# Patient Record
Sex: FEMALE | Race: WHITE | NOT HISPANIC OR LATINO | URBAN - METROPOLITAN AREA
[De-identification: names, ages, dates, MRNs, and addresses within clinical notes are randomized per-mention and may not be internally consistent; named-entity substitution may affect disease eponyms.]

---

## 2019-07-02 ENCOUNTER — TRANSFERRED RECORDS (OUTPATIENT)
Dept: HEALTH INFORMATION MANAGEMENT | Facility: CLINIC | Age: 59
End: 2019-07-02

## 2019-07-03 ENCOUNTER — TRANSFERRED RECORDS (OUTPATIENT)
Dept: HEALTH INFORMATION MANAGEMENT | Facility: CLINIC | Age: 59
End: 2019-07-03

## 2019-07-03 ENCOUNTER — HOSPITAL ENCOUNTER (OUTPATIENT)
Facility: CLINIC | Age: 59
Setting detail: OBSERVATION
Discharge: HOME | DRG: 914 | End: 2019-07-06
Attending: ORTHOPAEDIC SURGERY | Admitting: ORTHOPAEDIC SURGERY
Payer: COMMERCIAL

## 2019-07-03 DIAGNOSIS — S76.312A HAMSTRING MUSCLE STRAIN, LEFT, INITIAL ENCOUNTER: Primary | ICD-10-CM

## 2019-07-03 LAB
CREAT SERPL-MCNC: 0.98 MG/DL (ref 0.4–1)
GFR SERPL CREATININE-BSD FRML MDRD: 58 ML/MIN/1.73M2
GLUCOSE SERPL-MCNC: 159 MG/DL (ref 70–99)
INR PPP: 1.1 (ref 0.9–1.1)
POTASSIUM SERPL-SCNC: 3.9 MEQ/L (ref 3.4–5.1)

## 2019-07-03 PROCEDURE — 25000132 ZZH RX MED GY IP 250 OP 250 PS 637: Performed by: PHYSICIAN ASSISTANT

## 2019-07-03 PROCEDURE — G0378 HOSPITAL OBSERVATION PER HR: HCPCS

## 2019-07-03 PROCEDURE — 12000000 ZZH R&B MED SURG/OB

## 2019-07-03 RX ORDER — ACETAMINOPHEN 650 MG/1
650 SUPPOSITORY RECTAL EVERY 4 HOURS PRN
Status: DISCONTINUED | OUTPATIENT
Start: 2019-07-03 | End: 2019-07-06 | Stop reason: HOSPADM

## 2019-07-03 RX ORDER — FUROSEMIDE 20 MG
20 TABLET ORAL DAILY PRN
COMMUNITY

## 2019-07-03 RX ORDER — TRIAMCINOLONE ACETONIDE 1 MG/G
CREAM TOPICAL 3 TIMES DAILY PRN
COMMUNITY

## 2019-07-03 RX ORDER — FLUOCINONIDE 0.5 MG/G
OINTMENT TOPICAL 2 TIMES DAILY PRN
COMMUNITY
End: 2019-07-23

## 2019-07-03 RX ORDER — HYDROCODONE BITARTRATE AND ACETAMINOPHEN 5; 325 MG/1; MG/1
1-2 TABLET ORAL EVERY 6 HOURS PRN
Status: ON HOLD | COMMUNITY
End: 2019-07-06

## 2019-07-03 RX ORDER — OXYCODONE HYDROCHLORIDE 5 MG/1
5 TABLET ORAL EVERY 4 HOURS PRN
Status: DISCONTINUED | OUTPATIENT
Start: 2019-07-03 | End: 2019-07-06 | Stop reason: HOSPADM

## 2019-07-03 RX ORDER — NALOXONE HYDROCHLORIDE 0.4 MG/ML
.1-.4 INJECTION, SOLUTION INTRAMUSCULAR; INTRAVENOUS; SUBCUTANEOUS
Status: DISCONTINUED | OUTPATIENT
Start: 2019-07-03 | End: 2019-07-06 | Stop reason: HOSPADM

## 2019-07-03 RX ORDER — TRIAMCINOLONE ACETONIDE 0.25 MG/G
CREAM TOPICAL 2 TIMES DAILY
COMMUNITY

## 2019-07-03 RX ORDER — ACETAMINOPHEN 325 MG/1
650 TABLET ORAL EVERY 4 HOURS PRN
Status: DISCONTINUED | OUTPATIENT
Start: 2019-07-03 | End: 2019-07-06 | Stop reason: HOSPADM

## 2019-07-03 RX ORDER — FLUOCINONIDE TOPICAL SOLUTION USP, 0.05% 0.5 MG/ML
SOLUTION TOPICAL 2 TIMES DAILY PRN
COMMUNITY

## 2019-07-03 RX ORDER — HYDROXYZINE HYDROCHLORIDE 25 MG/1
25-50 TABLET, FILM COATED ORAL
COMMUNITY

## 2019-07-03 RX ADMIN — ACETAMINOPHEN 650 MG: 325 TABLET, FILM COATED ORAL at 17:43

## 2019-07-03 RX ADMIN — OXYCODONE HYDROCHLORIDE 5 MG: 5 TABLET ORAL at 17:43

## 2019-07-03 ASSESSMENT — ACTIVITIES OF DAILY LIVING (ADL)
TOILETING: 0-->INDEPENDENT
WHICH_OF_THE_ABOVE_FUNCTIONAL_RISKS_HAD_A_RECENT_ONSET_OR_CHANGE?: AMBULATION;TRANSFERRING;TOILETING;BATHING;DRESSING
ADLS_ACUITY_SCORE: 13
BATHING: 0-->INDEPENDENT
RETIRED_COMMUNICATION: 0-->UNDERSTANDS/COMMUNICATES WITHOUT DIFFICULTY
SWALLOWING: 0-->SWALLOWS FOODS/LIQUIDS WITHOUT DIFFICULTY
COGNITION: 0 - NO COGNITION ISSUES REPORTED
AMBULATION: 0-->INDEPENDENT
FALL_HISTORY_WITHIN_LAST_SIX_MONTHS: NO
ADLS_ACUITY_SCORE: 19
TRANSFERRING: 0-->INDEPENDENT
RETIRED_EATING: 0-->INDEPENDENT
DRESS: 0-->INDEPENDENT

## 2019-07-03 NOTE — PLAN OF CARE
DA from ANNA MARIE Lara @ 1600.  Non surgical, pain management.  VSS, ex slight tachy.  CMS intact, doppler pulse.  Numbness and tingling on LLE, ortho aware.  Started pt on oxy and tylenol for pain in hamstring.  Up with 1 to BSC.  Toleration diet.  Continue to monitor.

## 2019-07-04 ENCOUNTER — APPOINTMENT (OUTPATIENT)
Dept: PHYSICAL THERAPY | Facility: CLINIC | Age: 59
DRG: 914 | End: 2019-07-04
Attending: PHYSICIAN ASSISTANT
Payer: COMMERCIAL

## 2019-07-04 ENCOUNTER — APPOINTMENT (OUTPATIENT)
Dept: MRI IMAGING | Facility: CLINIC | Age: 59
DRG: 914 | End: 2019-07-04
Attending: ORTHOPAEDIC SURGERY
Payer: COMMERCIAL

## 2019-07-04 LAB
ANION GAP SERPL CALCULATED.3IONS-SCNC: 3 MMOL/L (ref 3–14)
BUN SERPL-MCNC: 28 MG/DL (ref 7–30)
CALCIUM SERPL-MCNC: 8.8 MG/DL (ref 8.5–10.1)
CHLORIDE SERPL-SCNC: 106 MMOL/L (ref 94–109)
CO2 SERPL-SCNC: 29 MMOL/L (ref 20–32)
CREAT SERPL-MCNC: 1.06 MG/DL (ref 0.52–1.04)
ERYTHROCYTE [DISTWIDTH] IN BLOOD BY AUTOMATED COUNT: 16.5 % (ref 10–15)
GFR SERPL CREATININE-BSD FRML MDRD: 58 ML/MIN/{1.73_M2}
GLUCOSE SERPL-MCNC: 123 MG/DL (ref 70–99)
HCT VFR BLD AUTO: 35.3 % (ref 35–47)
HGB BLD-MCNC: 11.8 G/DL (ref 11.7–15.7)
LACTATE BLD-SCNC: 0.9 MMOL/L (ref 0.7–2)
MAGNESIUM SERPL-MCNC: 2.4 MG/DL (ref 1.6–2.3)
MCH RBC QN AUTO: 27.9 PG (ref 26.5–33)
MCHC RBC AUTO-ENTMCNC: 33.4 G/DL (ref 31.5–36.5)
MCV RBC AUTO: 84 FL (ref 78–100)
PLATELET # BLD AUTO: 510 10E9/L (ref 150–450)
POTASSIUM SERPL-SCNC: 4.2 MMOL/L (ref 3.4–5.3)
RBC # BLD AUTO: 4.23 10E12/L (ref 3.8–5.2)
SODIUM SERPL-SCNC: 138 MMOL/L (ref 133–144)
TROPONIN I SERPL-MCNC: <0.015 UG/L (ref 0–0.04)
WBC # BLD AUTO: 12.8 10E9/L (ref 4–11)

## 2019-07-04 PROCEDURE — 83735 ASSAY OF MAGNESIUM: CPT | Performed by: NURSE PRACTITIONER

## 2019-07-04 PROCEDURE — 97116 GAIT TRAINING THERAPY: CPT | Mod: GP,XU | Performed by: PHYSICAL THERAPIST

## 2019-07-04 PROCEDURE — 12000000 ZZH R&B MED SURG/OB

## 2019-07-04 PROCEDURE — 36415 COLL VENOUS BLD VENIPUNCTURE: CPT | Performed by: ORTHOPAEDIC SURGERY

## 2019-07-04 PROCEDURE — 99207 ZZC CDG-MDM COMPONENT: MEETS LOW - DOWN CODED: CPT | Performed by: NURSE PRACTITIONER

## 2019-07-04 PROCEDURE — G0378 HOSPITAL OBSERVATION PER HR: HCPCS

## 2019-07-04 PROCEDURE — 85027 COMPLETE CBC AUTOMATED: CPT | Performed by: NURSE PRACTITIONER

## 2019-07-04 PROCEDURE — 93010 ELECTROCARDIOGRAM REPORT: CPT | Performed by: INTERNAL MEDICINE

## 2019-07-04 PROCEDURE — 99233 SBSQ HOSP IP/OBS HIGH 50: CPT | Performed by: NURSE PRACTITIONER

## 2019-07-04 PROCEDURE — 25000132 ZZH RX MED GY IP 250 OP 250 PS 637: Performed by: PHYSICIAN ASSISTANT

## 2019-07-04 PROCEDURE — 36415 COLL VENOUS BLD VENIPUNCTURE: CPT | Performed by: NURSE PRACTITIONER

## 2019-07-04 PROCEDURE — 97161 PT EVAL LOW COMPLEX 20 MIN: CPT | Mod: GP | Performed by: PHYSICAL THERAPIST

## 2019-07-04 PROCEDURE — 40000275 ZZH STATISTIC RCP TIME EA 10 MIN

## 2019-07-04 PROCEDURE — 25000125 ZZHC RX 250: Performed by: NURSE PRACTITIONER

## 2019-07-04 PROCEDURE — 73718 MRI LOWER EXTREMITY W/O DYE: CPT | Mod: LT

## 2019-07-04 PROCEDURE — 83605 ASSAY OF LACTIC ACID: CPT | Performed by: ORTHOPAEDIC SURGERY

## 2019-07-04 PROCEDURE — 80048 BASIC METABOLIC PNL TOTAL CA: CPT | Performed by: NURSE PRACTITIONER

## 2019-07-04 PROCEDURE — 93005 ELECTROCARDIOGRAM TRACING: CPT

## 2019-07-04 PROCEDURE — 25000132 ZZH RX MED GY IP 250 OP 250 PS 637: Performed by: NURSE PRACTITIONER

## 2019-07-04 PROCEDURE — 97530 THERAPEUTIC ACTIVITIES: CPT | Mod: GP | Performed by: PHYSICAL THERAPIST

## 2019-07-04 PROCEDURE — 84484 ASSAY OF TROPONIN QUANT: CPT | Performed by: NURSE PRACTITIONER

## 2019-07-04 RX ORDER — NITROGLYCERIN 0.4 MG/1
0.4 TABLET SUBLINGUAL EVERY 5 MIN PRN
Status: DISCONTINUED | OUTPATIENT
Start: 2019-07-04 | End: 2019-07-06 | Stop reason: HOSPADM

## 2019-07-04 RX ADMIN — OXYCODONE HYDROCHLORIDE 5 MG: 5 TABLET ORAL at 03:31

## 2019-07-04 RX ADMIN — OXYCODONE HYDROCHLORIDE 5 MG: 5 TABLET ORAL at 08:04

## 2019-07-04 RX ADMIN — OXYCODONE HYDROCHLORIDE 5 MG: 5 TABLET ORAL at 13:00

## 2019-07-04 RX ADMIN — LIDOCAINE HYDROCHLORIDE 30 ML: 20 SOLUTION ORAL; TOPICAL at 07:26

## 2019-07-04 RX ADMIN — NITROGLYCERIN 0.4 MG: 0.4 TABLET SUBLINGUAL at 05:49

## 2019-07-04 RX ADMIN — ACETAMINOPHEN 650 MG: 325 TABLET, FILM COATED ORAL at 13:00

## 2019-07-04 RX ADMIN — ACETAMINOPHEN 650 MG: 325 TABLET, FILM COATED ORAL at 03:31

## 2019-07-04 RX ADMIN — ACETAMINOPHEN 650 MG: 325 TABLET, FILM COATED ORAL at 17:02

## 2019-07-04 RX ADMIN — OXYCODONE HYDROCHLORIDE 5 MG: 5 TABLET ORAL at 22:39

## 2019-07-04 RX ADMIN — ACETAMINOPHEN 650 MG: 325 TABLET, FILM COATED ORAL at 08:06

## 2019-07-04 RX ADMIN — OXYCODONE HYDROCHLORIDE 5 MG: 5 TABLET ORAL at 17:00

## 2019-07-04 RX ADMIN — ACETAMINOPHEN 650 MG: 325 TABLET, FILM COATED ORAL at 22:42

## 2019-07-04 ASSESSMENT — ACTIVITIES OF DAILY LIVING (ADL)
ADLS_ACUITY_SCORE: 13

## 2019-07-04 NOTE — PHARMACY-ADMISSION MEDICATION HISTORY
Admission medication history interview status for the 7/3/2019  admission is complete. See EPIC admission navigator for prior to admission medications     Medication history source reliability:Good    Actions taken by pharmacist (provider contacted, etc):None     Additional medication history information not noted on PTA med list :None    Medication reconciliation/reorder completed by provider prior to medication history? No    Time spent in this activity: 15 min    Prior to Admission medications    Medication Sig Last Dose Taking? Auth Provider   fluocinonide (LIDEX) 0.05 % external ointment Apply topically 2 times daily as needed Past Week at Unknown time Yes Unknown, Entered By History   fluocinonide (LIDEX) 0.05 % external solution Apply topically 2 times daily as needed To scalp Past Week at Unknown time Yes Unknown, Entered By History   furosemide (LASIX) 20 MG tablet Take 20 mg by mouth daily as needed 7/1/2019 Yes Unknown, Entered By History   HYDROcodone-acetaminophen (NORCO) 5-325 MG tablet Take 1-2 tablets by mouth every 6 hours as needed for severe pain 7/2/2019 at pm Yes Unknown, Entered By History   hydrOXYzine (ATARAX) 25 MG tablet Take 25-50 mg by mouth nightly as needed for itching 6/19/2019 Yes Unknown, Entered By History   triamcinolone (KENALOG) 0.025 % cream Apply topically 2 times daily as needed for irritation To face and neck Past Week at Unknown time Yes Unknown, Entered By History   triamcinolone (KENALOG) 0.1 % external cream Apply topically 3 times daily as needed for irritation 7/2/2019 at Unknown time Yes Unknown, Entered By History

## 2019-07-04 NOTE — PLAN OF CARE
A&Ox4. VSS on RA. Up with A1, walker and gait belt to [facility]. Leg/hamstring pain managed with prn oxycodone. Large bruise to left posterior thigh; ice applied. Leg elevated as much as possible. Numbness and tingling remains the same in LLE. PCDs placed. MRI done; see results. Continue to monitor.

## 2019-07-04 NOTE — PROGRESS NOTES
Ortho - brief note  Left hamstring injury, hematoma, foot drop    Pain controlled, comfortable  Plan for MRI to evaluate hamstring  PT, AFO  No plans for urgent surgery  Pending MRI - follow up as outpt    Corrine Yepez  7:59 AM

## 2019-07-04 NOTE — PROGRESS NOTES
07/04/19 1325   Quick Adds   Type of Visit Initial PT Evaluation   Living Environment   Lives With significant other   Living Arrangements house   Home Accessibility stairs to enter home;stairs within home   Number of Stairs, Main Entrance none   Number of Stairs, Within Home, Primary 4   Stair Railings, Within Home, Primary none   Transportation Anticipated car, drives self   Living Environment Comment Pt's SO able to assist as needed.    Self-Care   Usual Activity Tolerance good   Current Activity Tolerance moderate   Regular Exercise Yes   Activity/Exercise Type walking   Equipment Currently Used at Home none   Activity/Exercise/Self-Care Comment Pt owns FWW and crutches.    Functional Level Prior   Ambulation 0-->independent   Transferring 0-->independent   Fall history within last six months no   General Information   Onset of Illness/Injury or Date of Surgery - Date 07/03/19   Referring Physician Rolly Morrow MD   Patient/Family Goals Statement None stated.    Pertinent History of Current Problem (include personal factors and/or comorbidities that impact the POC) 58 y/o female admitted after sustaining a L hamstring tear while water skiing.    Precautions/Limitations fall precautions   Weight-Bearing Status - LLE weight-bearing as tolerated   General Observations Pt in supine upon arrival of therapist.    General Info Comments Ambulate.    Cognitive Status Examination   Orientation orientation to person, place and time   Level of Consciousness alert   Follows Commands and Answers Questions 100% of the time   Personal Safety and Judgment intact   Pain Assessment   Patient Currently in Pain   (L foot pain at rest: 5/10)   Integumentary/Edema   Integumentary/Edema Comments Noted L LE swelling.    Posture    Posture Comments No deficits noted.    Range of Motion (ROM)   ROM Comment Limited L LE ROM secondary to pain and weakness.    Strength   Strength Comments L LE not formally assessed, noted 0/5  "dorsiflexion against gravity, otherwise pt demonstrate sufficient LE strength to complete mobility with assist and AD.    Bed Mobility   Bed Mobility Comments Supine <> sit, SBA.    Transfer Skills   Transfer Comments Sit <> stand with FWW and SBA.    Gait   Gait Comments Pt amb 5' with FWW and SBA.    Balance   Balance Comments Noted good sitting and standing balance at FWW.    Sensory Examination   Sensory Perception Comments Pt reported L LE numbness/tingling.    Modality Interventions   Planned Modality Interventions Cryotherapy   Planned Modality Interventions Comments PRN.   General Therapy Interventions   Planned Therapy Interventions bed mobility training;gait training;ROM;strengthening;transfer training   Clinical Impression   Criteria for Skilled Therapeutic Intervention yes, treatment indicated   PT Diagnosis Pain with gait.    Influenced by the following impairments Pain, Impaired L LE ROM, Numbness, Decreased activity tolerance   Functional limitations due to impairments Limited functional mobility requiring AD and assist.    Clinical Presentation Stable/Uncomplicated   Clinical Presentation Rationale Based on PMH, current presentation, and social support.    Clinical Decision Making (Complexity) Low complexity   Therapy Frequency Daily   Predicted Duration of Therapy Intervention (days/wks) 3 days   Anticipated Equipment Needs at Discharge   (AFO)   Anticipated Discharge Disposition Home with Outpatient Therapy   Risk & Benefits of therapy have been explained Yes   Patient, Family & other staff in agreement with plan of care Yes   [facility] [employer] TM \"6 Clicks\"   2016, Trustees of [facility], under license to [employer].  All rights reserved.   6 Clicks Short Forms Basic Mobility Inpatient Short Form   [facility] [employer]  \"6 Clicks\" V.2 Basic Mobility Inpatient Short Form   1. Turning from your back to your side while in a flat bed without using bedrails? 4 - None   2. Moving " from lying on your back to sitting on the side of a flat bed without using bedrails? 4 - None   3. Moving to and from a bed to a chair (including a wheelchair)? 3 - A Little   4. Standing up from a chair using your arms (e.g., wheelchair, or bedside chair)? 3 - A Little   5. To walk in hospital room? 3 - A Little   6. Climbing 3-5 steps with a railing? 3 - A Little   Basic Mobility Raw Score (Score out of 24.Lower scores equate to lower levels of function) 20   Total Evaluation Time   Total Evaluation Time (Minutes) 8

## 2019-07-04 NOTE — PLAN OF CARE
Discharge Planner PT   Patient plan for discharge: Return home.  Current status: Orders received, evaluation completed, and treatment initiated. Patient is a 60 y/o female admitted after sustaining L hamstring tear. Pt lives with her significant other in a house with 4 stairs to access living area. Pt reports independence at baseline and will have access to a FWW and crutches. Pt performed supine <> sit and sit <> stand with FWW and SBA. Pt ambulated 50 feet with FWW and SBA, noted high steppage gait d/t L foot drop. Trial of off the shelf AFO, not appropriate fit. Discussed with RN to discuss with MD to request an orthotics consult.   Barriers to return to prior living situation: Pain, L foot drop  Recommendations for discharge: Return home with assist from SO for mobility as needed-specifically navigation of stairs and OP PT  Rationale for recommendations: Pt will benefit from OP PT in order to improve strength and independence with functional mobility.        Entered by: Leny Moreira 07/04/2019 2:30 PM

## 2019-07-04 NOTE — PROVIDER NOTIFICATION
Pt c/o increased numbness today.  Corrine CADET rounded on this pt this am and is aware of this.  No new orders given, will continue to monitor this.

## 2019-07-04 NOTE — PROGRESS NOTES
"Hospitalist [facility] NP note    I was asked by the night shift house NP Mr. [place] Morales to follow up on pt's troponin levels & other lab data post RRT for chest pain    Ekg reviewed, NSR HR 88, normal to left axis, no acute ischemic changes  BMP, Mg, LA, CBC all WNL save for WBC of 12K  Trop neg x3 thus far.     Impression:  Chest pain, occurred upon waking on the early am of 7/4/19 which she rated as 7/10, decreased w/ nitroglycerin, minimal risk factors for CAD (never smoker mild hypertension not on medications slightly elevated cholesterol again not on medications nondiabetic, no known history of heart disease, no family history of sudden cardiac death or premature heart disease, minimal alcohol use, no hormone replacement therapy).  There is been no recurrence.  She states \"it's like it never happened\".  -Await third troponin-->neg.  -If third troponin is negative I have discussed with patient following up with her primary provider in 3 to 7 days to discuss this episode and defer decision for additional testing such as stress test to her PCP.  Patient is accepting of this plan.    Total time 10 minutes.  Is a no charge note.    Michaelle Alba CNP  [facility]  [phone]     Text Page         "

## 2019-07-04 NOTE — PLAN OF CARE
Pt has little c/o of pain , taking one oxycodone for relief.  Pt up with SBA of 1 and walker to the [facility].  Pt does c/o numbness in her left leg, leg is elevated.  The numbness has not increased since the PA saw pt and the pt reported this to her as well.  Pt went down to MRI.  Pt has no c/o chest pain during this shift.  Pt is currently slowly progressing towards her goals.

## 2019-07-04 NOTE — CONSULTS
Consult Date:  07/04/2019      DIAGNOSIS:  Left hamstring tear.      HISTORY OF PRESENT ILLNESS:  Ms. Sharpe is a very pleasant 59-year-old female who presented initially to the emergency room in [place], [place] after an injury while water skiing.  She was being pulled on the waterski and she felt a pop in her left hamstring, presented to the emergency room with pain.  At that time, a CT was taken which showed a hematoma in her left hamstring and she was given pain medication and discharged.  She then had increasing pain throughout overnight and then into the day on 07/03, where EMS was called, she was given pain medication, transferred back to the emergency room and plan was to admit for pain management.  Because there is no orthopedic coverage at that hospital, she has to be transferred to [facility] as her home is in the [place] area and she was referred to us for pain management and definitive care.      PAST MEDICAL AND SURGICAL HISTORY:  Includes hypercholesterolemia. She had a previous left knee surgery.  She has hypertension and edema.      CURRENT MEDICATIONS PRIOR TO ADMISSION:  She is on Lasix, Norco and Atarax.      A 10-point review of systems is positive for the left thigh pain and now some numbness and tingling in her left foot, which has developed over the last few days.  No chest pain, nausea, shortness of breath or headaches.  Other issues prior to admission:  Did have an element of chest pain last night.  This resolved with a GI cocktail and nitro.      PHYSICAL EXAMINATION:   GENERAL:  She is lying in her hospital bed.  She is alert, oriented, conversive, afebrile.   VITAL SIGNS:  Stable.     HEENT:  No obvious head trauma.   EXTREMITIES:  Right and left upper extremities are within normal limits.  There are no obvious cranial nerve deficits.  She is very comfortable, resting comfortably in her hospital bed.  Right and left upper extremities are within normal limits.  Skin is clean, dry  and intact.  Palpable radial pulses.  Radial, ulnar and median nerve sensation intact bilaterally.  No tenderness to palpation or crepitus.  Right and left lower extremities:  Her left posterior thigh, there is an area of ecchymosis which is firm and very tender.  No tenderness to palpation of the right lower extremity.  On the right lower extremity, she has full function of EHL, FHL, tibialis anterior and gastroc soleus.  She has palpable dorsalis pedis pulses bilaterally.  Skin is otherwise clean, dry and intact.  She has healed surgical scars in the left knee.  She has full range of motion of bilateral knees.  She has pain with range of motion of the hip, but she has full range of motion there.  She is tender to palpation over her left posterior thigh.  She is nontender to palpation on her left leg.  She has painless range of motion of her toes, but she has numbness and tingling, particularly on the dorsum of her foot, plantar aspect of her foot and extending up slightly into her leg.  She has weak plantar flexion and toe flexion and has very weak dorsiflexion and EHL function.  She has normal quad and hamstring strength.        CT scan from outside facility was reviewed.  This shows a hematoma in her left posterior thigh.  Because of the hamstring tear, difficult to determine from this modality the extent and nature of the tear.      LABORATORY DATA:  On admission, white blood cell count 12.8, hemoglobin 11.8.  Troponins were negative.  Creatinine 1.06, sodium 138, potassium 4.2.      IMPRESSION AND PLAN:  I had a long discussion with Ms. Sharpe regarding what appears to be a left hamstring tear.  It is difficult to determine if this is an avulsion or a tear within the muscle belly from the imaging.  I think we should obtain an MRI scan to assess that.  She has also developed a foot drop, which is likely due to hematoma compressing in and around her sciatic nerve.  At this point in time, I have her knee flexed  in an attempt to take tension off the nerve and to continue to watch.  We discussed that this will likely return in time and I do not think it is necessary to proceed with evacuation of the hematoma, although we would like to do that if decision is made to proceed with surgical intervention of the hamstring.  She understands and is happy with this plan of care.  We will obtain the MRI scan today.  I have her placed in an AFO to treat her foot drop for the time being, mobilize with physical therapy and if her pain can be controlled and she can be mobilized she can discharge with followup as an outpatient.  She understands and is happy with this plan of care.         GENE NIEVES MD             D: 2019   T: 2019   MT: BARBARA      Name:     JEANCARLOS WILKINSON   MRN:      [id number]        Account:       [id number]   :      1960           Consult Date:  2019      Document: [id number]       cc: Shannon Stover MD

## 2019-07-04 NOTE — PLAN OF CARE
Pt c/o acute episode chest and jaw pain this morning. Pt mentioned she had this episode in the past. Pt denies nausea and diaphoresis. Nitroglycerin and Mylanta given, with relief.  VSS on 2 L NC. Tele NSR. Blood work is good. Pt denies chest pain; pt stable. Ax1 to BSC. Regular diet. Continue to monitor.

## 2019-07-04 NOTE — CODE/RAPID RESPONSE
[facility]  House EMA RRT Note  7/4/2019   Time Called: 4443  RRT called for: Chest pain  Code Status: No Order - presumably full code    Assessment & Plan   I was paged to the bedside to evaluate Ms. Tasha Sharpe for an acute episode of chest pain, 7/10 upon awakening, midsternal pressure which radiates down left chest and up to her jaw, no exacerbating or palliating factors. No nausea or diaphoresis.     Patient endorses recent episode of chest pain prior to admission to the hospital which sounds familiar to how she describes this episode. Patient noted that she was hypertensive at the time, but this quickly resolved on its own. Patient denies tobacco use, no hormonal birth control, no immediate family history of CAD    Diagnosis:  -- chest pain, unknown etiology    Interventions ordered/provided:  -- 12 lead ECG -> no acute ischemia  -- labs -> CBC, BMP, mag, and troponin  -- SL nitroglycerin x 3 doses, pain resolution with 1 dose  -- GI cocktail  -- House EMA team will follow labs throughout the morning, primary team should consider a hospitalist consult if warranted.    At the conclusion of this RRT patient was hemodynamically stable and can remain on station 55.    Interval History     Ms. Tasha Sharpe is a 59 year old female who was admitted on 7/3/2019 for left leg hamstring tear with hematoma formation.    Her history is significant for:  No past medical history on file.  No past surgical history on file.    Allergies   No Known Allergies    Physical Exam   Physical Exam   Constitutional: She is oriented to person, place, and time. She appears well-developed.   HENT:   Head: Normocephalic and atraumatic.   Eyes: Pupils are equal, round, and reactive to light.   Neck: Normal range of motion. No JVD present.   Cardiovascular: Normal rate and regular rhythm.   Pulmonary/Chest: Effort normal and breath sounds normal. No respiratory distress.   Abdominal: Soft. She exhibits no distension.    Musculoskeletal: Normal range of motion. She exhibits edema (+1 pretibial RLE).   Neurological: She is alert and oriented to person, place, and time.   Skin: Skin is warm and dry. Capillary refill takes less than 2 seconds. No pallor.   Psychiatric: She has a normal mood and affect. Judgment normal.       Vital Signs with Ranges:  Temp:  [98.2  F (36.8  C)] 98.2  F (36.8  C)  Pulse:  [80-97] 88  Heart Rate:  [91-94] 91  Resp:  [12-16] 14  BP: (120-136)/(80-89) 134/89  SpO2:  [92 %-96 %] 96 %  I/O last 3 completed shifts:  In: -   Out: 300 [Urine:300]    Data     EKG:  Interpreted by PRISCILLA Carlos CNP  Time reviewed: 0538  Symptoms at time of EKG: chest pain, above   Rhythm: normal sinus   Rate: Normal  Axis: Normal  Ectopy: none  Conduction: normal  ST Segments/ T Waves: No acute ischemic changes and T wave flattening Inferior  Q Waves: none  Comparison to prior: No old EKG available  Clinical Impression: no acute changes    ABG:  -No lab results found in last 7 days.    Troponin:    No lab results found.    IMAGING: (X-ray/CT/MRI)   No results found for this or any previous visit (from the past 24 hour(s)).    CBC with Diff:  No lab results found.   No results found for: RETICABSCT  No results found for: RETP    Lactic Acid:    No results found for: LACT  No results found for: LACTS     Comprehensive Metabolic Panel:  No lab results found in last 7 days.    INR:    No lab results found.    D-DIMER:  No components found for: DDIMER    BNP:  No results found for: BNP    UA:  No results for input(s): COLOR, APPEARANCE, URINEGLC, URINEBILI, URINEKETONE, SG, UBLD, URINEPH, PROTEIN, UROBILINOGEN, NITRITE, LEUKEST, RBCU, WBCU in the last 168 hours.    Time Spent on this Encounter   I spent 30 minutes (4456 - 3041) of critical care time on the unit/floor managing the care of Tasha Sharpe. Upon evaluation, this patient had a high probability of imminent or life-threatening deterioration due to ACS, which  required my direct attention, intervention, and personal management. 100% of my time was spent at the bedside counseling the patient and/or coordinating care regarding services listed in this note.    PRISCILLA Carlos, CNP  Hospitalist - House EMA  Text Page  ([phone])

## 2019-07-05 ENCOUNTER — APPOINTMENT (OUTPATIENT)
Dept: PHYSICAL THERAPY | Facility: CLINIC | Age: 59
DRG: 914 | End: 2019-07-05
Attending: ORTHOPAEDIC SURGERY
Payer: COMMERCIAL

## 2019-07-05 PROCEDURE — G0378 HOSPITAL OBSERVATION PER HR: HCPCS

## 2019-07-05 PROCEDURE — 25000132 ZZH RX MED GY IP 250 OP 250 PS 637: Performed by: ORTHOPAEDIC SURGERY

## 2019-07-05 PROCEDURE — 12000000 ZZH R&B MED SURG/OB

## 2019-07-05 PROCEDURE — 97116 GAIT TRAINING THERAPY: CPT | Mod: GP,XU | Performed by: PHYSICAL THERAPIST

## 2019-07-05 PROCEDURE — 97530 THERAPEUTIC ACTIVITIES: CPT | Mod: GP | Performed by: PHYSICAL THERAPIST

## 2019-07-05 PROCEDURE — 25000132 ZZH RX MED GY IP 250 OP 250 PS 637: Performed by: PHYSICIAN ASSISTANT

## 2019-07-05 RX ORDER — CYCLOBENZAPRINE HCL 10 MG
10 TABLET ORAL 3 TIMES DAILY PRN
Status: DISCONTINUED | OUTPATIENT
Start: 2019-07-05 | End: 2019-07-06 | Stop reason: HOSPADM

## 2019-07-05 RX ADMIN — ACETAMINOPHEN 650 MG: 325 TABLET, FILM COATED ORAL at 21:19

## 2019-07-05 RX ADMIN — ACETAMINOPHEN 650 MG: 325 TABLET, FILM COATED ORAL at 15:29

## 2019-07-05 RX ADMIN — CYCLOBENZAPRINE HYDROCHLORIDE 10 MG: 10 TABLET, FILM COATED ORAL at 22:06

## 2019-07-05 RX ADMIN — ACETAMINOPHEN 650 MG: 325 TABLET, FILM COATED ORAL at 11:24

## 2019-07-05 RX ADMIN — ACETAMINOPHEN 650 MG: 325 TABLET, FILM COATED ORAL at 05:22

## 2019-07-05 RX ADMIN — OXYCODONE HYDROCHLORIDE 5 MG: 5 TABLET ORAL at 15:27

## 2019-07-05 RX ADMIN — OXYCODONE HYDROCHLORIDE 5 MG: 5 TABLET ORAL at 09:58

## 2019-07-05 RX ADMIN — OXYCODONE HYDROCHLORIDE 5 MG: 5 TABLET ORAL at 05:21

## 2019-07-05 RX ADMIN — OXYCODONE HYDROCHLORIDE 5 MG: 5 TABLET ORAL at 20:07

## 2019-07-05 ASSESSMENT — ACTIVITIES OF DAILY LIVING (ADL)
ADLS_ACUITY_SCORE: 13

## 2019-07-05 NOTE — PLAN OF CARE
Discharge Planner PT   Patient plan for discharge: Return home.  Current status: Pt in supine upon arrival of therapist, reported improvement in pain and sensation in L LE. Pt demonstrated ability to partially dorsiflex L foot. Pt performed bed mobility and sit <> stand with FWW independently. Pt ambulated 200 feet x 2 with FWW and SBA, WBAT. Pt ambulated an additional 15 feet with crutches and CGA, pt reported feeling hesitant with use of crutches vs FWW and prefers FWW. Pt navigated 3 stairs with 1 railing and crtuch, SBA for safety.   Barriers to return to prior living situation: Pain  Recommendations for discharge: Return home with assist from significant other for navigation of stairs  Rationale for recommendations: Pt continues to progress towards independence in order to safely return home. Pt's SO present for session and verbalized understanding on how to safely assist pt on stairs.        Entered by: Leny Moreira 07/05/2019 2:56 PM

## 2019-07-05 NOTE — PLAN OF CARE
"Pt A/O x4. Pt continues to have sensation loss on her L foot. She was able to feel when I touched her toes, and the bottom of her foot, but the top of her foot was described as feeling \"tingly at first, and then I feel nothing.\" Dorsi/plantarflexion is weak and pulses +1 in the LLE. L foot was elevated with pillows all night. Large bruise to posterior thigh. VSS on RA. No c/o chest pain or SOB. Up SBA; Voiding adequately BSC. Taking oxy and tylenol PRN for pain. Continue to monitor.     "

## 2019-07-05 NOTE — PROVIDER NOTIFICATION
Called  to receive orthotics order for pt (PT requested this writer to call ortho to get an orthotics order).   did not want to order orthotics for this pt.  No new orders received.

## 2019-07-05 NOTE — PLAN OF CARE
[id number] Pt A/Ox4. VSS on RA. CMS intact ex numbness/tingling in LLE. Large bruise to left posterior thigh. BLE elevated on pillow. Mild pain in leg/hamstring; Declined pain medication. Ax1 GBWW to BSC. Regular diet. Nursing will continue to monitor.

## 2019-07-05 NOTE — PROVIDER NOTIFICATION
Pt has been waiting for ortho to come and see her.  Called Leesa CADET(Dr.Nemanich CADET (at approx 1200) to see if she was rounding on pt.  Leesa called back and stated that she was off today, however, Janelle CADET would be in to see pt.  Called Janelle (aprox 1230 to see when she would be here and left message).  Did not hear back form Janelle and called  (at 4pm) on his cell to see if he would be in to see pt.   Talked to  and asked if he would be in to see pt.  He stated that Janelle had a family emergency and that he would look at pt's MRI and see what the plan was.  Gave  the floors phone number and he stated he would be calling back.  Awaiting call back.

## 2019-07-05 NOTE — PLAN OF CARE
PT: Pt without appropriate AFO at time of session. Discussed again with RN need for orthotics consult for new AFO to progress IND in ambulation and stair climbing.

## 2019-07-05 NOTE — PLAN OF CARE
Pt ding well today, up with SBA of 1 and walker.  Pt's CMS is unchanged with numbness in the left leg.  Pt able to ambulate the guadalupe with PT today.  Pt taking oxycodone for good pain relief.  Pt is progressing towards her goals.

## 2019-07-06 ENCOUNTER — APPOINTMENT (OUTPATIENT)
Dept: PHYSICAL THERAPY | Facility: CLINIC | Age: 59
DRG: 914 | End: 2019-07-06
Attending: ORTHOPAEDIC SURGERY
Payer: COMMERCIAL

## 2019-07-06 VITALS
HEART RATE: 98 BPM | OXYGEN SATURATION: 92 % | RESPIRATION RATE: 16 BRPM | SYSTOLIC BLOOD PRESSURE: 112 MMHG | TEMPERATURE: 99.2 F | DIASTOLIC BLOOD PRESSURE: 70 MMHG

## 2019-07-06 PROCEDURE — 25000132 ZZH RX MED GY IP 250 OP 250 PS 637: Performed by: PHYSICIAN ASSISTANT

## 2019-07-06 PROCEDURE — G0378 HOSPITAL OBSERVATION PER HR: HCPCS

## 2019-07-06 PROCEDURE — 25000132 ZZH RX MED GY IP 250 OP 250 PS 637: Performed by: ORTHOPAEDIC SURGERY

## 2019-07-06 PROCEDURE — 97116 GAIT TRAINING THERAPY: CPT | Mod: GP

## 2019-07-06 RX ORDER — ACETAMINOPHEN 325 MG/1
650 TABLET ORAL EVERY 6 HOURS PRN
Start: 2019-07-06 | End: 2019-07-23

## 2019-07-06 RX ORDER — OXYCODONE HYDROCHLORIDE 5 MG/1
5 TABLET ORAL EVERY 4 HOURS PRN
Qty: 40 TABLET | Refills: 0 | Status: ON HOLD | OUTPATIENT
Start: 2019-07-06 | End: 2019-07-24

## 2019-07-06 RX ORDER — CYCLOBENZAPRINE HCL 10 MG
10 TABLET ORAL 3 TIMES DAILY PRN
Qty: 30 TABLET | Refills: 0 | Status: SHIPPED | OUTPATIENT
Start: 2019-07-06

## 2019-07-06 RX ADMIN — OXYCODONE HYDROCHLORIDE 5 MG: 5 TABLET ORAL at 06:13

## 2019-07-06 RX ADMIN — OXYCODONE HYDROCHLORIDE 5 MG: 5 TABLET ORAL at 00:42

## 2019-07-06 RX ADMIN — CYCLOBENZAPRINE HYDROCHLORIDE 10 MG: 10 TABLET, FILM COATED ORAL at 06:21

## 2019-07-06 RX ADMIN — OXYCODONE HYDROCHLORIDE 5 MG: 5 TABLET ORAL at 10:25

## 2019-07-06 RX ADMIN — ACETAMINOPHEN 650 MG: 325 TABLET, FILM COATED ORAL at 11:34

## 2019-07-06 RX ADMIN — ACETAMINOPHEN 650 MG: 325 TABLET, FILM COATED ORAL at 06:21

## 2019-07-06 ASSESSMENT — ACTIVITIES OF DAILY LIVING (ADL)
ADLS_ACUITY_SCORE: 13

## 2019-07-06 NOTE — PROVIDER NOTIFICATION
"Walked into pt's room, pt c/o muscle spasms and \"being really cold\".  Gave pt her tylenol and put warm blanket on her.  Enrique CADET just happened to call and reported this to him.  Flexeril ordered and  will be up to see pt.  See new Dr's orders.   "
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
## 2019-07-06 NOTE — PROGRESS NOTES
Ortho  Avulsion left hamstring    No complaints  Pain improving  Plan to discharge to home today  Follow up with [facility] this week to discuss surgical options  Reviewed discharge instructions    Corrine Yepez  7:45 AM

## 2019-07-06 NOTE — PROGRESS NOTES
History:   Pain is well controlled on Percocet and Flexeril.  Feels left leg strength is improving.  Vitals:   B/P: 128/82, T: 98.3, P: 86, R: 17       Lab Results   Component Value Date     07/04/2019    Lab Results   Component Value Date    CHLORIDE 106 07/04/2019    Lab Results   Component Value Date    BUN 28 07/04/2019      Lab Results   Component Value Date    POTASSIUM 4.2 07/04/2019    Lab Results   Component Value Date    CO2 29 07/04/2019    Lab Results   Component Value Date    CR 1.06 07/04/2019        Lab Results   Component Value Date    WBC 12.8 (H) 07/04/2019    HGB 11.8 07/04/2019    HCT 35.3 07/04/2019    MCV 84 07/04/2019     (H) 07/04/2019         Intake/Output Summary (Last 24 hours) at 7/6/2019 0114  Last data filed at 7/5/2019 0827  Gross per 24 hour   Intake --   Output 300 ml   Net -300 ml      No results found for this or any previous visit (from the past 24 hour(s)).  .  Left buttock and proximal thigh has large ecchymosis which is tender.  Increased pain with attempts to flex her knee.  Neuro:   Motor: 3/5 in the left EHL 5/5 in the TA and gastrocsoleus which patient states is improved substantially from yesterday.     Sensation: intact  The MRI of the left proximal thigh done yesterday shows that the hamstring tendons have been avulsed from the initial tuberosity.  There is a large hematoma.  A/P:   59-year-old woman with avulsion of the common hamstring tendons on the left side from the initial tuberosity.  She has a left sciatic nerve compromise either related to a neuropraxia or perhaps compression from the hematoma.  Fortunately this is improving.  Physical therapy recommended an AFO for the left leg however I think I would hold off on this as I expect her strength to improve rather quickly.    With regards to, hamstring avulsion this could be surgically repaired.  I would suggest follow-up with Dr. High or Carlo to discuss this.    Patient can be discharged home  Saturday with follow-up in [facility] clinic next week.

## 2019-07-06 NOTE — PLAN OF CARE
Pt has been doing well up until muscle spasms started.   notified and flexeril will be given for this.  Pt's CMS continues to remain the same and pt is taking oxycodone and tylenol for good relief.  Pt is slowly progressing towards her goals.

## 2019-07-06 NOTE — PLAN OF CARE
Pt A/Ox4, VSS on RA, oxycodone and flexril for pain, assist of 1, CMS intact, non surgical. Possible discharge today, will continue to monitor

## 2019-07-06 NOTE — PLAN OF CARE
Pain managed with Oxycodone, Tylenol, and Flexeril, up with SBA and walker, voiding in BR, appetite good, VSS on RA, numbness to left LE, discharged home with , belongings returned including valuables from security.

## 2019-07-06 NOTE — PLAN OF CARE
Discharge Planner PT   Patient plan for discharge: Return home.  Current status: Pt in supine upon arrival of therapist, reported pain 4/10 in L LE and mild itching during ambulation. Pt requesting review of stair training for comfort upon return home. Pt continues to demonstrate ability to partially dorsiflex L foot with AROM significantly limited. Pt performed bed mobility IND and sit <> stand Edwina with FWW. Pt ambulated 200 feet x 2 with FWW and SBA, WBAT. Pt navigated 3 stairs with 1 railing and crutch, SBA for safety. Reports she plans to use crutches for transfers during transition home and will use own FWW upon arrival at house. Completed verbal pt education on car transfer for safety.  Barriers to return to prior living situation: None  Recommendations for discharge: Return home with assist from significant other for navigation of stairs, OP PT  Rationale for recommendations: Pt continues to progress towards independence in order to safely return home. Pt's SO present for session and verbalized understanding on how to safely assist pt on stairs. pt would benefit from skilled OP PT services to progress activity tolerance and IND in functional mobility in case of non-surgical management.       Entered by: Abigail Eisenberg 07/06/2019 10:57 AM      Physical Therapy Discharge Summary    Reason for therapy discharge:    All goals and outcomes met, no further needs identified.    Progress towards therapy goal(s). See goals on Care Plan in [facility] electronic health record for goal details.  Goals met    Therapy recommendation(s):    Continued therapy is recommended.  Rationale/Recommendations:  OP PT to progress strengthening and activity tolerance.

## 2019-07-10 LAB — INTERPRETATION ECG - MUSE: NORMAL

## 2019-07-23 RX ORDER — ACETAMINOPHEN 500 MG
500-1000 TABLET ORAL EVERY 4 HOURS PRN
Status: ON HOLD | COMMUNITY
End: 2019-07-24

## 2019-07-23 RX ORDER — FLUOCINONIDE 0.5 MG/G
OINTMENT TOPICAL 2 TIMES DAILY PRN
COMMUNITY

## 2019-07-24 ENCOUNTER — ANESTHESIA (OUTPATIENT)
Dept: SURGERY | Facility: CLINIC | Age: 59
End: 2019-07-24
Payer: COMMERCIAL

## 2019-07-24 ENCOUNTER — ANESTHESIA EVENT (OUTPATIENT)
Dept: SURGERY | Facility: CLINIC | Age: 59
End: 2019-07-24
Payer: COMMERCIAL

## 2019-07-24 ENCOUNTER — HOSPITAL ENCOUNTER (OUTPATIENT)
Facility: CLINIC | Age: 59
Discharge: HOME | End: 2019-07-24
Attending: ORTHOPAEDIC SURGERY | Admitting: ORTHOPAEDIC SURGERY
Payer: COMMERCIAL

## 2019-07-24 VITALS
BODY MASS INDEX: 33.28 KG/M2 | OXYGEN SATURATION: 92 % | WEIGHT: 212.06 LBS | SYSTOLIC BLOOD PRESSURE: 136 MMHG | HEART RATE: 97 BPM | RESPIRATION RATE: 16 BRPM | HEIGHT: 67 IN | DIASTOLIC BLOOD PRESSURE: 87 MMHG | TEMPERATURE: 98 F

## 2019-07-24 DIAGNOSIS — S76.312A HAMSTRING MUSCLE STRAIN, LEFT, INITIAL ENCOUNTER: Primary | ICD-10-CM

## 2019-07-24 LAB — HCG UR QL: NEGATIVE

## 2019-07-24 PROCEDURE — C1713 ANCHOR/SCREW BN/BN,TIS/BN: HCPCS | Performed by: ORTHOPAEDIC SURGERY

## 2019-07-24 PROCEDURE — 25000125 ZZHC RX 250: Performed by: NURSE ANESTHETIST, CERTIFIED REGISTERED

## 2019-07-24 PROCEDURE — 27211024 ZZHC OR SUPPLY OTHER OPNP: Performed by: ORTHOPAEDIC SURGERY

## 2019-07-24 PROCEDURE — 71000027 ZZH RECOVERY PHASE 2 EACH 15 MINS: Performed by: ORTHOPAEDIC SURGERY

## 2019-07-24 PROCEDURE — 71000013 ZZH RECOVERY PHASE 1 LEVEL 1 EA ADDTL HR: Performed by: ORTHOPAEDIC SURGERY

## 2019-07-24 PROCEDURE — 27210794 ZZH OR GENERAL SUPPLY STERILE: Performed by: ORTHOPAEDIC SURGERY

## 2019-07-24 PROCEDURE — 25000132 ZZH RX MED GY IP 250 OP 250 PS 637: Performed by: PHYSICIAN ASSISTANT

## 2019-07-24 PROCEDURE — 36000056 ZZH SURGERY LEVEL 3 1ST 30 MIN: Performed by: ORTHOPAEDIC SURGERY

## 2019-07-24 PROCEDURE — 81025 URINE PREGNANCY TEST: CPT | Performed by: ANESTHESIOLOGY

## 2019-07-24 PROCEDURE — 37000008 ZZH ANESTHESIA TECHNICAL FEE, 1ST 30 MIN: Performed by: ORTHOPAEDIC SURGERY

## 2019-07-24 PROCEDURE — 25000128 H RX IP 250 OP 636: Performed by: NURSE ANESTHETIST, CERTIFIED REGISTERED

## 2019-07-24 PROCEDURE — 71000012 ZZH RECOVERY PHASE 1 LEVEL 1 FIRST HR: Performed by: ORTHOPAEDIC SURGERY

## 2019-07-24 PROCEDURE — 25800030 ZZH RX IP 258 OP 636: Performed by: NURSE ANESTHETIST, CERTIFIED REGISTERED

## 2019-07-24 PROCEDURE — 36000058 ZZH SURGERY LEVEL 3 EA 15 ADDTL MIN: Performed by: ORTHOPAEDIC SURGERY

## 2019-07-24 PROCEDURE — 25000128 H RX IP 250 OP 636: Performed by: PHYSICIAN ASSISTANT

## 2019-07-24 PROCEDURE — 25000566 ZZH SEVOFLURANE, EA 15 MIN: Performed by: ORTHOPAEDIC SURGERY

## 2019-07-24 PROCEDURE — 25000128 H RX IP 250 OP 636: Performed by: ORTHOPAEDIC SURGERY

## 2019-07-24 PROCEDURE — 40000170 ZZH STATISTIC PRE-PROCEDURE ASSESSMENT II: Performed by: ORTHOPAEDIC SURGERY

## 2019-07-24 PROCEDURE — 37000009 ZZH ANESTHESIA TECHNICAL FEE, EACH ADDTL 15 MIN: Performed by: ORTHOPAEDIC SURGERY

## 2019-07-24 PROCEDURE — 25000131 ZZH RX MED GY IP 250 OP 636 PS 637: Performed by: PHYSICIAN ASSISTANT

## 2019-07-24 DEVICE — IMPLANTABLE DEVICE: Type: IMPLANTABLE DEVICE | Site: BUTTOCKS | Status: FUNCTIONAL

## 2019-07-24 RX ORDER — HYDROMORPHONE HYDROCHLORIDE 1 MG/ML
.3-.5 INJECTION, SOLUTION INTRAMUSCULAR; INTRAVENOUS; SUBCUTANEOUS EVERY 10 MIN PRN
Status: DISCONTINUED | OUTPATIENT
Start: 2019-07-24 | End: 2019-07-24 | Stop reason: HOSPADM

## 2019-07-24 RX ORDER — NEOSTIGMINE METHYLSULFATE 1 MG/ML
VIAL (ML) INJECTION PRN
Status: DISCONTINUED | OUTPATIENT
Start: 2019-07-24 | End: 2019-07-24

## 2019-07-24 RX ORDER — OXYCODONE HYDROCHLORIDE 5 MG/1
TABLET ORAL
Qty: 30 TABLET | Refills: 0 | Status: SHIPPED | OUTPATIENT
Start: 2019-07-24

## 2019-07-24 RX ORDER — ONDANSETRON 2 MG/ML
INJECTION INTRAMUSCULAR; INTRAVENOUS PRN
Status: DISCONTINUED | OUTPATIENT
Start: 2019-07-24 | End: 2019-07-24

## 2019-07-24 RX ORDER — GLYCOPYRROLATE 0.2 MG/ML
INJECTION, SOLUTION INTRAMUSCULAR; INTRAVENOUS PRN
Status: DISCONTINUED | OUTPATIENT
Start: 2019-07-24 | End: 2019-07-24

## 2019-07-24 RX ORDER — FENTANYL CITRATE 50 UG/ML
INJECTION, SOLUTION INTRAMUSCULAR; INTRAVENOUS PRN
Status: DISCONTINUED | OUTPATIENT
Start: 2019-07-24 | End: 2019-07-24

## 2019-07-24 RX ORDER — PROPOFOL 10 MG/ML
INJECTION, EMULSION INTRAVENOUS PRN
Status: DISCONTINUED | OUTPATIENT
Start: 2019-07-24 | End: 2019-07-24

## 2019-07-24 RX ORDER — OXYCODONE HYDROCHLORIDE 5 MG/1
5 TABLET ORAL
Status: COMPLETED | OUTPATIENT
Start: 2019-07-24 | End: 2019-07-24

## 2019-07-24 RX ORDER — ACETAMINOPHEN 325 MG/1
975 TABLET ORAL ONCE
Status: CANCELLED | OUTPATIENT
Start: 2019-07-24

## 2019-07-24 RX ORDER — LIDOCAINE HYDROCHLORIDE 20 MG/ML
INJECTION, SOLUTION INFILTRATION; PERINEURAL PRN
Status: DISCONTINUED | OUTPATIENT
Start: 2019-07-24 | End: 2019-07-24

## 2019-07-24 RX ORDER — SODIUM CHLORIDE, SODIUM LACTATE, POTASSIUM CHLORIDE, CALCIUM CHLORIDE 600; 310; 30; 20 MG/100ML; MG/100ML; MG/100ML; MG/100ML
INJECTION, SOLUTION INTRAVENOUS CONTINUOUS
Status: DISCONTINUED | OUTPATIENT
Start: 2019-07-24 | End: 2019-07-24 | Stop reason: HOSPADM

## 2019-07-24 RX ORDER — ACETAMINOPHEN 325 MG/1
325 TABLET ORAL EVERY 4 HOURS PRN
Qty: 40 TABLET | Refills: 0 | Status: SHIPPED | OUTPATIENT
Start: 2019-07-24

## 2019-07-24 RX ORDER — DEXAMETHASONE SODIUM PHOSPHATE 4 MG/ML
INJECTION, SOLUTION INTRA-ARTICULAR; INTRALESIONAL; INTRAMUSCULAR; INTRAVENOUS; SOFT TISSUE PRN
Status: DISCONTINUED | OUTPATIENT
Start: 2019-07-24 | End: 2019-07-24

## 2019-07-24 RX ORDER — AMOXICILLIN 250 MG
1-2 CAPSULE ORAL 2 TIMES DAILY
Qty: 10 TABLET | Refills: 0 | Status: SHIPPED | OUTPATIENT
Start: 2019-07-24

## 2019-07-24 RX ORDER — CEFAZOLIN SODIUM 1 G/3ML
1 INJECTION, POWDER, FOR SOLUTION INTRAMUSCULAR; INTRAVENOUS EVERY 8 HOURS
Status: DISCONTINUED | OUTPATIENT
Start: 2019-07-24 | End: 2019-07-24 | Stop reason: HOSPADM

## 2019-07-24 RX ORDER — NALOXONE HYDROCHLORIDE 0.4 MG/ML
.1-.4 INJECTION, SOLUTION INTRAMUSCULAR; INTRAVENOUS; SUBCUTANEOUS
Status: DISCONTINUED | OUTPATIENT
Start: 2019-07-24 | End: 2019-07-24 | Stop reason: HOSPADM

## 2019-07-24 RX ORDER — EPHEDRINE SULFATE 50 MG/ML
INJECTION, SOLUTION INTRAMUSCULAR; INTRAVENOUS; SUBCUTANEOUS PRN
Status: DISCONTINUED | OUTPATIENT
Start: 2019-07-24 | End: 2019-07-24

## 2019-07-24 RX ORDER — ONDANSETRON 4 MG/1
4 TABLET, ORALLY DISINTEGRATING ORAL EVERY 30 MIN PRN
Status: DISCONTINUED | OUTPATIENT
Start: 2019-07-24 | End: 2019-07-24 | Stop reason: HOSPADM

## 2019-07-24 RX ORDER — ACETAMINOPHEN 325 MG/1
650 TABLET ORAL
Status: DISCONTINUED | OUTPATIENT
Start: 2019-07-24 | End: 2019-07-24 | Stop reason: HOSPADM

## 2019-07-24 RX ORDER — FENTANYL CITRATE 50 UG/ML
25-50 INJECTION, SOLUTION INTRAMUSCULAR; INTRAVENOUS
Status: DISCONTINUED | OUTPATIENT
Start: 2019-07-24 | End: 2019-07-24 | Stop reason: HOSPADM

## 2019-07-24 RX ORDER — MEPERIDINE HYDROCHLORIDE 25 MG/ML
12.5 INJECTION INTRAMUSCULAR; INTRAVENOUS; SUBCUTANEOUS
Status: DISCONTINUED | OUTPATIENT
Start: 2019-07-24 | End: 2019-07-24 | Stop reason: HOSPADM

## 2019-07-24 RX ORDER — BUPIVACAINE HYDROCHLORIDE 2.5 MG/ML
INJECTION, SOLUTION INFILTRATION; PERINEURAL PRN
Status: DISCONTINUED | OUTPATIENT
Start: 2019-07-24 | End: 2019-07-24 | Stop reason: HOSPADM

## 2019-07-24 RX ORDER — CEFAZOLIN SODIUM 2 G/100ML
2 INJECTION, SOLUTION INTRAVENOUS
Status: COMPLETED | OUTPATIENT
Start: 2019-07-24 | End: 2019-07-24

## 2019-07-24 RX ORDER — ONDANSETRON 2 MG/ML
4 INJECTION INTRAMUSCULAR; INTRAVENOUS EVERY 30 MIN PRN
Status: DISCONTINUED | OUTPATIENT
Start: 2019-07-24 | End: 2019-07-24 | Stop reason: HOSPADM

## 2019-07-24 RX ORDER — ONDANSETRON 4 MG/1
4 TABLET, ORALLY DISINTEGRATING ORAL
Status: COMPLETED | OUTPATIENT
Start: 2019-07-24 | End: 2019-07-24

## 2019-07-24 RX ORDER — CEFAZOLIN SODIUM 1 G/3ML
1 INJECTION, POWDER, FOR SOLUTION INTRAMUSCULAR; INTRAVENOUS SEE ADMIN INSTRUCTIONS
Status: DISCONTINUED | OUTPATIENT
Start: 2019-07-24 | End: 2019-07-24 | Stop reason: HOSPADM

## 2019-07-24 RX ORDER — HYDROXYZINE PAMOATE 25 MG/1
25 CAPSULE ORAL 3 TIMES DAILY PRN
Qty: 10 CAPSULE | Refills: 0 | Status: SHIPPED | OUTPATIENT
Start: 2019-07-24

## 2019-07-24 RX ORDER — SODIUM CHLORIDE, SODIUM LACTATE, POTASSIUM CHLORIDE, CALCIUM CHLORIDE 600; 310; 30; 20 MG/100ML; MG/100ML; MG/100ML; MG/100ML
INJECTION, SOLUTION INTRAVENOUS CONTINUOUS PRN
Status: DISCONTINUED | OUTPATIENT
Start: 2019-07-24 | End: 2019-07-24

## 2019-07-24 RX ADMIN — PHENYLEPHRINE HYDROCHLORIDE 100 MCG: 10 INJECTION INTRAVENOUS at 11:11

## 2019-07-24 RX ADMIN — PHENYLEPHRINE HYDROCHLORIDE 100 MCG: 10 INJECTION INTRAVENOUS at 11:07

## 2019-07-24 RX ADMIN — HYDROMORPHONE HYDROCHLORIDE 0.5 MG: 1 INJECTION, SOLUTION INTRAMUSCULAR; INTRAVENOUS; SUBCUTANEOUS at 10:42

## 2019-07-24 RX ADMIN — ROCURONIUM BROMIDE 30 MG: 10 INJECTION INTRAVENOUS at 10:21

## 2019-07-24 RX ADMIN — ROCURONIUM BROMIDE 20 MG: 10 INJECTION INTRAVENOUS at 10:39

## 2019-07-24 RX ADMIN — FENTANYL CITRATE 100 MCG: 50 INJECTION, SOLUTION INTRAMUSCULAR; INTRAVENOUS at 10:18

## 2019-07-24 RX ADMIN — DEXAMETHASONE SODIUM PHOSPHATE 4 MG: 4 INJECTION, SOLUTION INTRA-ARTICULAR; INTRALESIONAL; INTRAMUSCULAR; INTRAVENOUS; SOFT TISSUE at 10:48

## 2019-07-24 RX ADMIN — PHENYLEPHRINE HYDROCHLORIDE 100 MCG: 10 INJECTION INTRAVENOUS at 11:02

## 2019-07-24 RX ADMIN — Medication 10 MG: at 11:25

## 2019-07-24 RX ADMIN — PHENYLEPHRINE HYDROCHLORIDE 150 MCG: 10 INJECTION INTRAVENOUS at 11:26

## 2019-07-24 RX ADMIN — SODIUM CHLORIDE, POTASSIUM CHLORIDE, SODIUM LACTATE AND CALCIUM CHLORIDE: 600; 310; 30; 20 INJECTION, SOLUTION INTRAVENOUS at 10:15

## 2019-07-24 RX ADMIN — GLYCOPYRROLATE 0.4 MG: 0.2 INJECTION, SOLUTION INTRAMUSCULAR; INTRAVENOUS at 11:22

## 2019-07-24 RX ADMIN — ONDANSETRON 4 MG: 4 TABLET, ORALLY DISINTEGRATING ORAL at 14:05

## 2019-07-24 RX ADMIN — LIDOCAINE HYDROCHLORIDE 100 MG: 20 INJECTION, SOLUTION INFILTRATION; PERINEURAL at 10:21

## 2019-07-24 RX ADMIN — MIDAZOLAM 2 MG: 1 INJECTION INTRAMUSCULAR; INTRAVENOUS at 10:18

## 2019-07-24 RX ADMIN — PROPOFOL 200 MG: 10 INJECTION, EMULSION INTRAVENOUS at 10:21

## 2019-07-24 RX ADMIN — ONDANSETRON 4 MG: 2 INJECTION INTRAMUSCULAR; INTRAVENOUS at 10:18

## 2019-07-24 RX ADMIN — SODIUM CHLORIDE, POTASSIUM CHLORIDE, SODIUM LACTATE AND CALCIUM CHLORIDE: 600; 310; 30; 20 INJECTION, SOLUTION INTRAVENOUS at 11:33

## 2019-07-24 RX ADMIN — NEOSTIGMINE METHYLSULFATE 3 MG: 1 INJECTION, SOLUTION INTRAVENOUS at 11:22

## 2019-07-24 RX ADMIN — CEFAZOLIN SODIUM 2 G: 2 INJECTION, SOLUTION INTRAVENOUS at 10:30

## 2019-07-24 RX ADMIN — PHENYLEPHRINE HYDROCHLORIDE 150 MCG: 10 INJECTION INTRAVENOUS at 11:15

## 2019-07-24 RX ADMIN — OXYCODONE HYDROCHLORIDE 5 MG: 5 TABLET ORAL at 13:07

## 2019-07-24 ASSESSMENT — LIFESTYLE VARIABLES: TOBACCO_USE: 0

## 2019-07-24 ASSESSMENT — MIFFLIN-ST. JEOR: SCORE: 1579.54

## 2019-07-24 NOTE — BRIEF OP NOTE
[facility]    Brief Operative Note    Pre-operative diagnosis: LEFT PROXIMAL HAMSTRING RUPTURE  Post-operative diagnosis Left proximal hamstring rupture  Procedure: Procedure(s):  LEFT PROXIMAL HAMSTRING REPAIR  Surgeon: Surgeon(s) and Role:     * Ming Paulino MD - Primary     * Blanca Ji PA-C - Assisting  Anesthesia: General   Estimated blood loss: Minimal  Drains: None  Specimens: * No specimens in log *  Findings:   As expected..  Complications: None.  Implants:    Implant Name Type Inv. Item Serial No.  Lot No. LRB No. Used   SM[employer] 2.8MM Q-FIX SUTURE ANCHOR     [employer] [id number] Left 2

## 2019-07-24 NOTE — ANESTHESIA PREPROCEDURE EVALUATION
Anesthesia Pre-Procedure Evaluation    Patient: Tasha Sharpe   MRN: [id number] : 1960          Preoperative Diagnosis: LEFT PROXIMAL HAMSTRING RUPTURE    Procedure(s):  LEFT PROXIMAL HAMSTRING REPAIR (SM[employer] NEPHEW Q FIX ANCHORS)^    Past Medical History:   Diagnosis Date     Anemia      Chronic eczema of hand      Chronic migraine without aura without status migrainosus, not intractable      Episodic tension type headache      Foot pain, bilateral      Hamstring injury, initial encounter      Heartburn      Menorrhagia with regular cycle      Obese      Olecranon bursitis of left elbow      Other pulmonary embolism without acute cor pulmonale (H)      PE (pulmonary thromboembolism) (H)      Trochanteric bursitis of both hips      Past Surgical History:   Procedure Laterality Date     COLONOSCOPY       ENT SURGERY      Tonsillectomy & Adenoidectomy     GYN SURGERY       x 3, Tubal Ligation     HEAD & NECK SURGERY      Toone Teeth Extraction       Anesthesia Evaluation     .             ROS/MED HX    ENT/Pulmonary:      (-) tobacco use and sleep apnea   Neurologic:       Cardiovascular:  - neg cardiovascular ROS       METS/Exercise Tolerance:     Hematologic:         Musculoskeletal:         GI/Hepatic:     (+) GERD Asymptomatic on medication,       Renal/Genitourinary:         Endo:     (+) Obesity, .      Psychiatric:         Infectious Disease:         Malignancy:         Other:                          Physical Exam  Normal systems: cardiovascular, pulmonary and dental    Airway   Mallampati: II  TM distance: >3 FB  Neck ROM: full    Dental     Cardiovascular       Pulmonary             Lab Results   Component Value Date    WBC 12.8 (H) 2019    HGB 11.8 2019    HCT 35.3 2019     (H) 2019     2019    POTASSIUM 4.2 2019    CHLORIDE 106 2019    CO2 29 2019    BUN 28 2019    CR 1.06 (H) 2019     (H) 2019     "WINNIE 8.8 07/04/2019    MAG 2.4 (H) 07/04/2019    INR 1.1 07/03/2019    HCG Negative 07/24/2019       Preop Vitals  BP Readings from Last 3 Encounters:   07/24/19 (!) 145/92   07/06/19 112/70    Pulse Readings from Last 3 Encounters:   07/06/19 98      Resp Readings from Last 3 Encounters:   07/24/19 16   07/06/19 16    SpO2 Readings from Last 3 Encounters:   07/24/19 97%   07/06/19 92%      Temp Readings from Last 1 Encounters:   07/24/19 36.2  C (97.1  F) (Oral)    Ht Readings from Last 1 Encounters:   07/24/19 1.702 m (5' 7\")      Wt Readings from Last 1 Encounters:   07/24/19 96.2 kg (212 lb 1 oz)    Estimated body mass index is 33.21 kg/m  as calculated from the following:    Height as of this encounter: 1.702 m (5' 7\").    Weight as of this encounter: 96.2 kg (212 lb 1 oz).       Anesthesia Plan      History & Physical Review  History and physical reviewed and following examination; no interval change.    ASA Status:  2 .    NPO Status:  > 8 hours    Plan for General and ETT with Intravenous induction. Maintenance will be Balanced.    PONV prophylaxis:  Ondansetron (or other 5HT-3) and Dexamethasone or Solumedrol       Postoperative Care  Postoperative pain management:  Multi-modal analgesia.      Consents  Anesthetic plan, risks, benefits and alternatives discussed with:  Patient.  Use of blood products discussed: No .   .                 Viet Sutton MD  "

## 2019-07-24 NOTE — DISCHARGE INSTRUCTIONS
Same Day Surgery Discharge Instructions for  Sedation and General Anesthesia       It's not unusual to feel dizzy, light-headed or faint for up to 24 hours after surgery or while taking pain medication.  If you have these symptoms: sit for a few minutes before standing and have someone assist you when you get up to walk or use the bathroom.      You should rest and relax for the next 24 hours. We recommend you make arrangements to have an adult stay with you for at least 24 hours after your discharge.  Avoid hazardous and strenuous activity.      DO NOT DRIVE any vehicle or operate mechanical equipment for 24 hours following the end of your surgery.  Even though you may feel normal, your reactions may be affected by the medication you have received.      Do not drink alcoholic beverages for 24 hours following surgery.       Slowly progress to your regular diet as you feel able. It's not unusual to feel nauseated and/or vomit after receiving anesthesia.  If you develop these symptoms, drink clear liquids (apple juice, ginger ale, broth, 7-up, etc. ) until you feel better.  If your nausea and vomiting persists for 24 hours, please notify your surgeon.        All narcotic pain medications, along with inactivity and anesthesia, can cause constipation. Drinking plenty of liquids and increasing fiber intake will help.      For any questions of a medical nature, call your surgeon.      Do not make important decisions for 24 hours.      If you had general anesthesia, you may have a sore throat for a couple of days related to the breathing tube used during surgery.  You may use Cepacol lozenges to help with this discomfort.  If it worsens or if you develop a fever, contact your surgeon.       If you feel your pain is not well managed with the pain medications prescribed by your surgeon, please contact your surgeon's office to let them know so they can address your concerns.           Crutch Instructions      Because of  your surgery you will need crutches.  Make sure you tell your healthcare provider if crutches do not seem to work for you.  Using Crutches   Crutches are the most commonly used device for injuries to the lower part of the body because they allow the most mobility. All walking assistance devices require strength in your upper body but crutches require more coordination. If you are unable to use crutches then a cane or walker may be better.   Remember these rules when using crutches:   Always look straight ahead when you walk. Do not look down.   Hold the top padded part of the crutches into your chest near your armpits. Grasp the padded hand  and always support your weight with your arms and hands.   Do not lean on the crutches with your armpit as this could cause nerve damage.  Standing Up  Make sure you are in a stable chair. Move forward to the edge of the chair so that your  good  foot is flat on the floor. Put both crutches together and hold the handgrips in one hand. Stretch the injured leg out straight and then use the crutches with one hand and the chair armrest with the other hand to push yourself into a standing position onto your  good  leg. Once you are standing, wait until you are steady before placing a crutch in each hand. Until you become good at standing, have someone assist you in case you lose your balance. When standing still, lean slightly forward with the crutches ahead of you and about 3 feet apart. Unless you are told otherwise, you should keep weight off your injured leg.  Walking  To begin crutch walking, balance yourself on your  good  leg. Move both crutches forward about the length of one step and place them firmly on the floor in front of you about 3 feet apart. Support your weight on the padded hand rests while leaning forward. Press the top of the crutches against your chest wall and not into your armpits. Be sure to hold the injured leg up off of the floor. When ready, swing the  "\"good\" leg forward about one step length past the crutches while supporting your weight on the padded hand . Be careful not to go too far. Transfer your weight onto the \"good\" leg then bring both crutches forward about the length of one step. Repeating this motion will allow you to move fairly quickly without the use of your injured leg. Keep practicing until you become good at crutch walking.  Sitting Down  Make sure the chair you are about to sit on is stable. Walk in front of the chair such that you are facing away from it. Move back a little at a time until the back of your  good  leg is nearly touching the seat. Keeping your weight on the  good  leg, move both crutches to one hand holding onto the handgrips. Lean forward, bend your  good  knee, and move your injured leg out forward. Sit down slowly while using your free hand to reach for the chair s armrest for support. Place the crutches where you can easily get them. Never pivot, even on your  good  leg. This could cause you to fall or injure your  good  leg.  Navigating Stairs, Curbs & Door Steps  Only attempt this once you are very comfortable with regular crutch walking and only when someone is there to steady you should you begin to lose your balance. Hold on to a  railing with one hand and both crutches in the other hand or have someone carry the loose crutch for you. It does not matter which side the handrail is on. If there is no handrail, you can use both crutches. Using only crutches is the same as the railing method but maintaining your balance can be difficult. The crutch-only method is not recommended until you have become very good at crutch walking. Be sure to only take one step at a time. A simple rule to remember for crutches when navigating stairs or curbs: up with the  good  leg and down with the  bad .  Going Up Stairs or Curbs  Walk close to the first step with the crutches slightly behind you. Push down on the handrail and " "the crutch and step up with the \"good\" leg. You may have to make a short hop to do this if you are not allowed to place any weight on the injured leg. Lean forward and bring the injured leg and the crutch up beside the \"good\" leg. Repeat this until you have climbed up all of the steps. Remember, the \"good\" leg goes up first and the crutches move with the injured leg. The person helping you should stand behind and to your side that is away from the railing.  Going Down Stairs or Curbs  Walk to the edge of the first step. While standing and balancing on the  good  leg, place both crutches in one hand and then down on the step below. Be sure to support your weight by leaning on the crutches and handrail. Bring the injured leg forward, then the \"good\" leg down to the same step as the crutches. Remember crutches go down first with the injured leg. The person helping you should  front and to your side that is away from the railing.  Sitting Method for Navigating Stairs  A safer way to get up and down stairs is to sit down. To go up steps, sit down on the second or third step. Push with your  good  leg below while pushing up with both arms on the step above to move your bottom to the next step. When you get to the top of the stairs you may need to use the  scooting  method described later to get back up. To go down steps you first need to lower yourself to the floor near the top of the steps. Once seated, support yourself with your  good  leg on the second to next step below, and push with both arms on the step where you are sitting to move your bottom down to the next step. When you reach the bottom, pull yourself up to standing position using your crutches. It is helpful if someone can move your crutches and assist you in getting up and down. With practice it may be possible to manage on your own.  If You Start To Fall  If you start to fall and cannot get your balance, throw the crutches away from you. Try to " "fall onto your  good  side away from your injury and then break your fall with your arms. If uninjured, you can usually get back up by moving into a sitting position and scooting to a chair. Push with your arms and hands on the chair seat while pushing with the \"good\" leg to get up into the chair. You should not attempt to get back up if you are having significant pain. Call for assistance or dial 911 for an ambulance if necessary.  Safety Tips for Crutch Walking   At first you may want to wear a training belt (or a strong regular belt) so others can assist you.   Do not use crutches if you feel dizzy or drowsy.   Be careful on slick or wet surfaces like a kitchen or bathroom floor, snow, ice, or rainy conditions.   Temporarily remove pets, throw rugs or other items from your home that might trip you.   Do not hop around while holding onto furniture.   Wear sneakers or rubber soled shoes that will not easily slip or come off.   Be careful on ramps or slopes as they can be harder to walk up or down   Do not remove any parts from your crutches especially the padding or rubber traction footings. Replace padding or footings that become damaged immediately.   It is important to use your crutches correctly. If you feel any numbness or tingling in your arms, you are probably using the crutches incorrectly.  Helpful Hints   A bedside toilet or toilet riser may be helpful.   Always allow for extra time to get around. Children in school should be given permission to leave class early to avoid crowds when changing classes.   Elevate the injured leg when sitting.   Use a backpack to carry books or waist pouch to carry other items so that both hands are free.   Call your healthcare provider if you have any questions or concerns.          Reasons to contact your surgeon:    1. Signs of possible infection: Check your incision daily for redness, swelling, warmth, red streaks or foul drainage.   2. Elevated temperature.  3. Pain not " controlled with pain medication and/or rest.   4. Uncontrolled nausea or vomiting.  5. Any questions or concerns.      **If you have questions or concerns about your procedure,   call Dr. Paulino at [phone]**

## 2019-07-24 NOTE — ANESTHESIA POSTPROCEDURE EVALUATION
Patient: Tasha Sharpe    Procedure(s):  LEFT PROXIMAL HAMSTRING REPAIR    Diagnosis:LEFT PROXIMAL HAMSTRING RUPTURE  Diagnosis Additional Information: No value filed.    Anesthesia Type:  General, ETT    Note:  Anesthesia Post Evaluation    Patient location during evaluation: PACU  Patient participation: Able to fully participate in evaluation  Level of consciousness: awake and alert  Pain management: adequate  Airway patency: patent  Cardiovascular status: acceptable and stable  Respiratory status: acceptable, spontaneous ventilation, unassisted and nonlabored ventilation  Hydration status: acceptable  PONV: none             Last vitals:  Vitals:    07/24/19 1245 07/24/19 1300 07/24/19 1407   BP: 115/77 (!) 124/92 136/87   Pulse: 82 97    Resp: 16 22 16   Temp:  36.7  C (98  F)    SpO2: 97% 97% 92%         Electronically Signed By: Viet Sutton MD  July 24, 2019  2:53 PM

## 2019-07-24 NOTE — OP NOTE
Procedure Date: 07/24/2019      PREOPERATIVE DIAGNOSIS:  Left proximal hamstring rupture.      POSTOPERATIVE DIAGNOSIS:  Left proximal hamstring rupture.      PROCEDURES PERFORMED:  Open repair of left proximal hamstring rupture.      SURGEON:  Ming Paulino MD.      ASSISTANT:  Blanca Ji PA-C.      COMPLICATIONS:  None.      IMPLANTS USED:  [employer] 2.8 mm Q-Fix anchor x2 double loaded.      ESTIMATED BLOOD LOSS:  10 mL.      ANESTHESIA:  General with local anesthetic.      INDICATIONS FOR PROCEDURE:  The patient is a 59-year-old active female who had a rupture of her left proximal hamstring due to significant disability as well as retraction of the incomplete tendon.  Risks and benefits of surgery were discussed.  The patient elected to proceed.      OPERATIVE REPORT:  The patient was identified in the preoperative holding area.  The left gluteal area surgical site was marked.  The patient was brought to the operating suite and underwent general anesthesia.  She was placed in the prone position.  All bony prominences were well padded.  Her left and gluteal area was prepped and draped in normal sterile fashion.  A timeout was taken per hospital policy to identify the patient and surgical limb.  The patient received IV antibiotics within 30 minutes of incision.      I then proceeded on with making a transverse incision along the crease of her gluteal crease.  Dissected down through subcutaneous tissue to the level of the gluteal fascia.  This was incised.  With blunt dissection with the use my finger, I was able to expose the proximal hamstring rupture.  Large amount of hematoma was evacuated.  This was copiously irrigated.  Again, a large amount of old hematoma was taken out.  Care was taken to place no retractors laterally or deep.  A single retractor was used to retract the gluteus muscle proximally.  This exposed the ischial tuberosity.  The soft tissue over the ischial tuberosity was sharply  debrided with use of a Murphy, curet, and rongeur.  After the bone was exposed, we then proceeded on with placing two 2.8 mm double loaded Q-Fix anchors.  With the use of a free needle, this was passed proximal hamstring end.  These were then brought down to the bone and tied.  This provided excellent support within the origin.  The wound was copiously irrigated.  Deep fascial layer was closed with 2-0 Vicryl in interrupted fashion.  Subcutaneous tissue was closed with a combination of 2-0 and 3-0 Vicryl in interrupted fashion.  Skin was closed with a running 3-0 Monocryl in subcuticular layer.  Dermabond was applied.  Soft dressing was applied.  The patient awakened from anesthesia and brought to the recovery room in stable condition.      POSTOPERATIVE PLAN:  The patient will be discharged home.  She will be placed back on her Xarelto for DVT prophylaxis.  Brace at all times.  Crutches and nonweightbearing.      Blanca Ji PA-C was critical for assistance in patient positioning, retraction, wound exposure, implant placement and wound closure.         MIROSLAVA BAUM MD             D: 2019   T: 2019   MT: CHETNA      Name:     JEANCARLOS WILKINSON   MRN:      [id number]        Account:        [id number]   :      1960           Procedure Date: 2019      Document: [id number]

## 2019-07-24 NOTE — ANESTHESIA CARE TRANSFER NOTE
Patient: Tasha Sharpe    Procedure(s):  LEFT PROXIMAL HAMSTRING REPAIR    Diagnosis: LEFT PROXIMAL HAMSTRING RUPTURE  Diagnosis Additional Information: No value filed.    Anesthesia Type:   General, ETT     Note:  Airway :Face Mask  Patient transferred to:PACU  Comments: A&O x 3.  Denies SOB, pain, N&V.  Report to RN.      Vitals: (Last set prior to Anesthesia Care Transfer)    CRNA VITALS  7/24/2019 1112 - 7/24/2019 1150      7/24/2019             NIBP:  167/110  (Abnormal)     Pulse:  114    NIBP Mean:  129    SpO2:  98 %    Resp Rate (set):  10                Electronically Signed By: Jayne Interiano  July 24, 2019  11:50 AM

## 2019-07-24 NOTE — OR NURSING
Pt had sudden onset of nausea, with emesis x 2 in Phase II.  Zofran given.  Feeling well enough for discharge after 20 minutes.  Assisted into car by this writer.

## 2024-07-02 ENCOUNTER — HOSPITAL ENCOUNTER (EMERGENCY)
Facility: HOSPITAL | Age: 64
Discharge: HOME | End: 2024-07-02
Attending: NURSE PRACTITIONER | Admitting: NURSE PRACTITIONER
Payer: COMMERCIAL

## 2024-07-02 VITALS
SYSTOLIC BLOOD PRESSURE: 146 MMHG | OXYGEN SATURATION: 96 % | RESPIRATION RATE: 18 BRPM | BODY MASS INDEX: 33.2 KG/M2 | WEIGHT: 212 LBS | TEMPERATURE: 97.8 F | HEART RATE: 82 BPM | DIASTOLIC BLOOD PRESSURE: 90 MMHG

## 2024-07-02 DIAGNOSIS — L03.115 CELLULITIS OF RIGHT LOWER EXTREMITY: Primary | ICD-10-CM

## 2024-07-02 PROCEDURE — G0463 HOSPITAL OUTPT CLINIC VISIT: HCPCS

## 2024-07-02 PROCEDURE — 99213 OFFICE O/P EST LOW 20 MIN: CPT | Performed by: NURSE PRACTITIONER

## 2024-07-02 RX ORDER — CEFADROXIL 500 MG/1
500 CAPSULE ORAL 2 TIMES DAILY
Qty: 14 CAPSULE | Refills: 0 | Status: SHIPPED | OUTPATIENT
Start: 2024-07-02 | End: 2024-07-09

## 2024-07-02 ASSESSMENT — ENCOUNTER SYMPTOMS
JOINT SWELLING: 1
COLOR CHANGE: 1
MYALGIAS: 1

## 2024-07-02 ASSESSMENT — ACTIVITIES OF DAILY LIVING (ADL): ADLS_ACUITY_SCORE: 38

## 2024-07-02 NOTE — DISCHARGE INSTRUCTIONS
You have a skin infection known as cellulitis.  This will be treated with the antibiotic that was prescribed today.  Take the antibiotic until it is finished. You may take Tylenol or ibuprofen as needed for pain.    If the redness or swelling continues to get worse you may return to urgent care or emergency room for evaluation.    Follow-up with your primary doctor as needed.

## 2024-07-02 NOTE — ED PROVIDER NOTES
History     Chief Complaint   Patient presents with    Rash     HPI  Tasha Sharpe is a 64 year old female who presents ambulatory to urgent care for evaluation of a rash or cellulitis to her right leg.  The patient states that she was walking through the woods yesterday and was exposed to some weeds.  Shortly after walking she had some itchiness and mild redness to her anterior right lower leg.  She went into a hot tub last night and when she got out the redness had worsened and she was warm and tender to the touch to the anterior right lower leg.  She outlined the area.  A niece who is a nurse looked at it and advised her to get seen out of concern of cellulitis.  No fever or chills.  Patient tells me that swelling to her leg is a little worse than normal.  She does take Lasix.    Allergies:  No Known Allergies    Problem List:    Patient Active Problem List    Diagnosis Date Noted    Hamstring muscle strain, left, initial encounter 2019     Priority: Medium        Past Medical History:    Past Medical History:   Diagnosis Date    Anemia     Chronic eczema of hand     Chronic migraine without aura without status migrainosus, not intractable     Episodic tension type headache     Foot pain, bilateral     Hamstring injury, initial encounter     Heartburn     Menorrhagia with regular cycle     Obese     Olecranon bursitis of left elbow     Other pulmonary embolism without acute cor pulmonale (H)     PE (pulmonary thromboembolism) (H)     Trochanteric bursitis of both hips        Past Surgical History:    Past Surgical History:   Procedure Laterality Date    COLONOSCOPY      ENT SURGERY      Tonsillectomy & Adenoidectomy    GYN SURGERY       x 3, Tubal Ligation    HEAD & NECK SURGERY      [place] Teeth Extraction    REPAIR TENDON HAMSTRING Left 2019    Procedure: LEFT PROXIMAL HAMSTRING REPAIR;  Surgeon: Ming Paulino MD;  Location:  OR       Family History:    History reviewed. No  pertinent family history.    Social History:  Marital Status:   [2]  Social History     Tobacco Use    Smoking status: Never    Smokeless tobacco: Never   Substance Use Topics    Alcohol use: Yes     Comment: 1 or 2 drinks 2-4x/ month    Drug use: Never        Medications:    acetaminophen (TYLENOL) 325 MG tablet  cefadroxil (DURICEF) 500 MG capsule  cyclobenzaprine (FLEXERIL) 10 MG tablet  fluocinonide (LIDEX) 0.05 % external ointment  fluocinonide (LIDEX) 0.05 % external solution  furosemide (LASIX) 20 MG tablet  hydrOXYzine (VISTARIL) 25 MG capsule  oxyCODONE (ROXICODONE) 5 MG tablet  senna-docusate (SENOKOT-S/PERICOLACE) 8.6-50 MG tablet  triamcinolone (KENALOG) 0.025 % cream  triamcinolone (KENALOG) 0.1 % external cream  hydrOXYzine (ATARAX) 25 MG tablet          Review of Systems   Musculoskeletal:  Positive for joint swelling and myalgias.   Skin:  Positive for color change.   All other systems reviewed and are negative.      Physical Exam   BP: 146/90  Pulse: 82  Temp: 97.8  F (36.6  C)  Resp: 18  Weight: 96.2 kg (212 lb)  SpO2: 96 %      Physical Exam  Vitals and nursing note reviewed.   Constitutional:       General: She is not in acute distress.     Appearance: Normal appearance. She is not diaphoretic.   HENT:      Head: Atraumatic.      Mouth/Throat:      Mouth: Mucous membranes are moist.   Eyes:      Conjunctiva/sclera: Conjunctivae normal.   Cardiovascular:      Rate and Rhythm: Normal rate.   Pulmonary:      Effort: Pulmonary effort is normal. No respiratory distress.   Musculoskeletal:      Cervical back: Neck supple.   Skin:     General: Skin is warm.      Coloration: Skin is not pale.      Findings: Erythema present.      Comments: Redness to anterior right lower leg.  Area is outlined.  Area is warm and tender to the touch.  No weeping.  Swelling to right leg worse than the left.   Neurological:      Mental Status: She is alert and oriented to person, place, and time.         ED Course         Procedures         No results found for this or any previous visit (from the past 24 hour(s)).    Medications - No data to display    Assessments & Plan (with Medical Decision Making)   This is a 64-year-old female that presented for evaluation of redness and swelling to the right lower leg.  Symptoms started yesterday after she took a walk in the woods and worsened after she got out of the hot tub.  Physical exam findings are most consistent with cellulitis of the right lower leg.  This will be treated with cefadroxil as prescribed.  Recommended Tylenol or ibuprofen as needed for pain.  Continue taking Lasix as prescribed.  Area has already been outlined.  Patient was advised to continue observing the redness and if it gets worse or she develops any other concerning symptoms she may return here for evaluation.  Follow-up with primary doctor as needed.    I have reviewed the nursing notes.    I have reviewed the findings, diagnosis, plan and need for follow up with the patient.  This document was prepared using a combination of typing and voice generated software.  While every attempt was made for accuracy, spelling and grammatical errors may exist.         New Prescriptions    CEFADROXIL (DURICEF) 500 MG CAPSULE    Take 1 capsule (500 mg) by mouth 2 times daily for 7 days       Final diagnoses:   Cellulitis of right lower extremity       7/2/2024   [facility]       Mpofu, Prudence, CNP  07/02/24 0955

## 2024-07-02 NOTE — ED TRIAGE NOTES
Pt presents with c/o having increased redness to the right shin, and thought it was a rash and is having increasing pain to the area  Pt reports noticed it on Saturday   Pt does have it marked with own pen   No otc meds taken today

## (undated) DEVICE — LINEN TOWEL PACK X5 5464

## (undated) DEVICE — NDL 22GA 1.5"

## (undated) DEVICE — SU VICRYL 3-0 SH 27" UND J416H

## (undated) DEVICE — SU MONOCRYL 3-0 PS-2 27" Y427H

## (undated) DEVICE — GLOVE PROTEXIS MICRO 8.0  2D73PM80

## (undated) DEVICE — Device

## (undated) DEVICE — PREP CHLORAPREP 26ML TINTED ORANGE  260815

## (undated) DEVICE — DRAPE CONVERTORS U-DRAPE 60X72" 8476

## (undated) DEVICE — PACK TOTAL HIP W/U DRAPE RIVERSIDE LATEX FREE

## (undated) DEVICE — DRSG TEGADERM 4X4 3/4" 1626

## (undated) DEVICE — SOL WATER IRRIG 1000ML BOTTLE 2F7114

## (undated) DEVICE — ESU GROUND PAD UNIVERSAL W/O CORD

## (undated) DEVICE — SU VICRYL 2-0 CT-1 27" UND J259H

## (undated) DEVICE — GLOVE ORTHO 8 LATEX

## (undated) DEVICE — ADH SKIN CLOSURE PREMIERPRO EXOFIN 1.0ML 3470

## (undated) RX ORDER — CEFAZOLIN SODIUM 2 G/100ML
INJECTION, SOLUTION INTRAVENOUS
Status: DISPENSED
Start: 2019-07-24

## (undated) RX ORDER — BUPIVACAINE HYDROCHLORIDE 2.5 MG/ML
INJECTION, SOLUTION EPIDURAL; INFILTRATION; INTRACAUDAL
Status: DISPENSED
Start: 2019-07-24

## (undated) RX ORDER — HYDROMORPHONE HYDROCHLORIDE 1 MG/ML
INJECTION, SOLUTION INTRAMUSCULAR; INTRAVENOUS; SUBCUTANEOUS
Status: DISPENSED
Start: 2019-07-24

## (undated) RX ORDER — ONDANSETRON 2 MG/ML
INJECTION INTRAMUSCULAR; INTRAVENOUS
Status: DISPENSED
Start: 2019-07-24

## (undated) RX ORDER — FENTANYL CITRATE 50 UG/ML
INJECTION, SOLUTION INTRAMUSCULAR; INTRAVENOUS
Status: DISPENSED
Start: 2019-07-24

## (undated) RX ORDER — ONDANSETRON 4 MG/1
TABLET, ORALLY DISINTEGRATING ORAL
Status: DISPENSED
Start: 2019-07-24

## (undated) RX ORDER — OXYCODONE HYDROCHLORIDE 5 MG/1
TABLET ORAL
Status: DISPENSED
Start: 2019-07-24

## (undated) RX ORDER — PROPOFOL 10 MG/ML
INJECTION, EMULSION INTRAVENOUS
Status: DISPENSED
Start: 2019-07-24

## (undated) RX ORDER — ACETAMINOPHEN 325 MG/1
TABLET ORAL
Status: DISPENSED
Start: 2019-07-24